# Patient Record
Sex: FEMALE | Race: WHITE | NOT HISPANIC OR LATINO
[De-identification: names, ages, dates, MRNs, and addresses within clinical notes are randomized per-mention and may not be internally consistent; named-entity substitution may affect disease eponyms.]

---

## 2018-04-19 ENCOUNTER — RESULT REVIEW (OUTPATIENT)
Age: 63
End: 2018-04-19

## 2018-05-30 ENCOUNTER — APPOINTMENT (OUTPATIENT)
Dept: PLASTIC SURGERY | Facility: CLINIC | Age: 63
End: 2018-05-30
Payer: SELF-PAY

## 2018-05-30 PROBLEM — Z00.00 ENCOUNTER FOR PREVENTIVE HEALTH EXAMINATION: Status: ACTIVE | Noted: 2018-05-30

## 2018-05-30 PROCEDURE — 15788 CHEMICAL PEEL FACIAL EPIDRM: CPT

## 2018-06-20 ENCOUNTER — APPOINTMENT (OUTPATIENT)
Dept: PLASTIC SURGERY | Facility: CLINIC | Age: 63
End: 2018-06-20
Payer: COMMERCIAL

## 2018-06-20 VITALS
TEMPERATURE: 98.4 F | HEIGHT: 62 IN | WEIGHT: 118 LBS | BODY MASS INDEX: 21.71 KG/M2 | DIASTOLIC BLOOD PRESSURE: 53 MMHG | SYSTOLIC BLOOD PRESSURE: 90 MMHG | OXYGEN SATURATION: 98 % | HEART RATE: 61 BPM | RESPIRATION RATE: 20 BRPM

## 2018-06-20 PROCEDURE — 11402 EXC TR-EXT B9+MARG 1.1-2 CM: CPT | Mod: 59

## 2018-06-20 PROCEDURE — 12041 INTMD RPR N-HF/GENIT 2.5CM/<: CPT | Mod: 59

## 2018-06-20 PROCEDURE — 12031 INTMD RPR S/A/T/EXT 2.5 CM/<: CPT

## 2018-06-20 PROCEDURE — 11422 EXC H-F-NK-SP B9+MARG 1.1-2: CPT | Mod: 59

## 2018-06-21 ENCOUNTER — APPOINTMENT (OUTPATIENT)
Dept: PLASTIC SURGERY | Facility: CLINIC | Age: 63
End: 2018-06-21
Payer: SELF-PAY

## 2018-06-21 PROCEDURE — 17999 UNLISTD PX SKN MUC MEMB SUBQ: CPT | Mod: 79

## 2018-06-28 ENCOUNTER — OTHER (OUTPATIENT)
Age: 63
End: 2018-06-28

## 2018-06-28 ENCOUNTER — APPOINTMENT (OUTPATIENT)
Dept: PLASTIC SURGERY | Facility: CLINIC | Age: 63
End: 2018-06-28
Payer: COMMERCIAL

## 2018-06-28 ENCOUNTER — APPOINTMENT (OUTPATIENT)
Dept: PLASTIC SURGERY | Facility: CLINIC | Age: 63
End: 2018-06-28
Payer: SELF-PAY

## 2018-06-28 DIAGNOSIS — D23.61 OTHER BENIGN NEOPLASM OF SKIN OF RIGHT UPPER LIMB, INCLUDING SHOULDER: ICD-10-CM

## 2018-06-28 DIAGNOSIS — D23.5 OTHER BENIGN NEOPLASM OF SKIN OF TRUNK: ICD-10-CM

## 2018-06-28 PROCEDURE — 99024 POSTOP FOLLOW-UP VISIT: CPT

## 2018-06-28 PROCEDURE — 17999 UNLISTD PX SKN MUC MEMB SUBQ: CPT | Mod: NC

## 2018-07-11 ENCOUNTER — APPOINTMENT (OUTPATIENT)
Dept: PLASTIC SURGERY | Facility: CLINIC | Age: 63
End: 2018-07-11

## 2018-07-19 ENCOUNTER — APPOINTMENT (OUTPATIENT)
Dept: PLASTIC SURGERY | Facility: CLINIC | Age: 63
End: 2018-07-19
Payer: SELF-PAY

## 2018-07-19 PROCEDURE — 17999 UNLISTD PX SKN MUC MEMB SUBQ: CPT | Mod: 79

## 2018-08-09 ENCOUNTER — APPOINTMENT (OUTPATIENT)
Dept: PLASTIC SURGERY | Facility: CLINIC | Age: 63
End: 2018-08-09
Payer: SELF-PAY

## 2018-08-09 PROCEDURE — 17999 UNLISTD PX SKN MUC MEMB SUBQ: CPT | Mod: 79

## 2018-08-28 ENCOUNTER — APPOINTMENT (OUTPATIENT)
Dept: PLASTIC SURGERY | Facility: CLINIC | Age: 63
End: 2018-08-28
Payer: SELF-PAY

## 2018-08-28 PROCEDURE — 17999 UNLISTD PX SKN MUC MEMB SUBQ: CPT | Mod: 79

## 2018-09-18 ENCOUNTER — APPOINTMENT (OUTPATIENT)
Dept: PLASTIC SURGERY | Facility: CLINIC | Age: 63
End: 2018-09-18
Payer: SELF-PAY

## 2018-09-18 PROCEDURE — 17999 UNLISTD PX SKN MUC MEMB SUBQ: CPT

## 2018-10-05 ENCOUNTER — APPOINTMENT (OUTPATIENT)
Dept: PLASTIC SURGERY | Facility: CLINIC | Age: 63
End: 2018-10-05
Payer: SELF-PAY

## 2018-10-05 PROCEDURE — 17999 UNLISTD PX SKN MUC MEMB SUBQ: CPT

## 2018-10-23 ENCOUNTER — APPOINTMENT (OUTPATIENT)
Dept: PLASTIC SURGERY | Facility: CLINIC | Age: 63
End: 2018-10-23
Payer: SELF-PAY

## 2018-10-23 PROCEDURE — 17999 UNLISTD PX SKN MUC MEMB SUBQ: CPT

## 2018-11-09 ENCOUNTER — APPOINTMENT (OUTPATIENT)
Dept: PLASTIC SURGERY | Facility: CLINIC | Age: 63
End: 2018-11-09
Payer: SELF-PAY

## 2018-11-09 PROCEDURE — 15789 CHEMICAL PEEL FACIAL DERMAL: CPT

## 2018-12-04 ENCOUNTER — APPOINTMENT (OUTPATIENT)
Dept: PLASTIC SURGERY | Facility: CLINIC | Age: 63
End: 2018-12-04
Payer: SELF-PAY

## 2018-12-04 PROCEDURE — 15789 CHEMICAL PEEL FACIAL DERMAL: CPT | Mod: 78

## 2019-02-08 ENCOUNTER — APPOINTMENT (OUTPATIENT)
Dept: PLASTIC SURGERY | Facility: CLINIC | Age: 64
End: 2019-02-08
Payer: SELF-PAY

## 2019-02-08 PROCEDURE — 15789 CHEMICAL PEEL FACIAL DERMAL: CPT | Mod: 79

## 2019-03-12 ENCOUNTER — APPOINTMENT (OUTPATIENT)
Dept: PLASTIC SURGERY | Facility: CLINIC | Age: 64
End: 2019-03-12
Payer: SELF-PAY

## 2019-03-12 PROCEDURE — 15789 CHEMICAL PEEL FACIAL DERMAL: CPT | Mod: 79

## 2019-04-10 ENCOUNTER — APPOINTMENT (OUTPATIENT)
Dept: PLASTIC SURGERY | Facility: CLINIC | Age: 64
End: 2019-04-10
Payer: SELF-PAY

## 2019-04-10 PROCEDURE — 15789 CHEMICAL PEEL FACIAL DERMAL: CPT | Mod: 78

## 2019-05-07 ENCOUNTER — APPOINTMENT (OUTPATIENT)
Dept: PLASTIC SURGERY | Facility: CLINIC | Age: 64
End: 2019-05-07
Payer: SELF-PAY

## 2019-05-07 PROCEDURE — 15789 CHEMICAL PEEL FACIAL DERMAL: CPT | Mod: 78

## 2019-05-28 ENCOUNTER — APPOINTMENT (OUTPATIENT)
Dept: PLASTIC SURGERY | Facility: CLINIC | Age: 64
End: 2019-05-28
Payer: SELF-PAY

## 2019-05-28 PROCEDURE — 15789 CHEMICAL PEEL FACIAL DERMAL: CPT | Mod: 78

## 2019-06-18 ENCOUNTER — APPOINTMENT (OUTPATIENT)
Dept: PLASTIC SURGERY | Facility: CLINIC | Age: 64
End: 2019-06-18
Payer: SELF-PAY

## 2019-06-18 PROCEDURE — 15789 CHEMICAL PEEL FACIAL DERMAL: CPT | Mod: 78

## 2019-06-21 ENCOUNTER — APPOINTMENT (OUTPATIENT)
Dept: PLASTIC SURGERY | Facility: CLINIC | Age: 64
End: 2019-06-21

## 2019-07-16 ENCOUNTER — APPOINTMENT (OUTPATIENT)
Dept: PLASTIC SURGERY | Facility: CLINIC | Age: 64
End: 2019-07-16
Payer: SELF-PAY

## 2019-07-16 PROCEDURE — 15789 CHEMICAL PEEL FACIAL DERMAL: CPT | Mod: 78

## 2019-08-16 ENCOUNTER — APPOINTMENT (OUTPATIENT)
Dept: PLASTIC SURGERY | Facility: CLINIC | Age: 64
End: 2019-08-16
Payer: SELF-PAY

## 2019-08-16 PROCEDURE — 15789 CHEMICAL PEEL FACIAL DERMAL: CPT | Mod: 78

## 2019-09-13 ENCOUNTER — APPOINTMENT (OUTPATIENT)
Dept: PLASTIC SURGERY | Facility: CLINIC | Age: 64
End: 2019-09-13
Payer: SELF-PAY

## 2019-09-13 PROCEDURE — 15789 CHEMICAL PEEL FACIAL DERMAL: CPT | Mod: 58

## 2019-10-11 ENCOUNTER — APPOINTMENT (OUTPATIENT)
Dept: PLASTIC SURGERY | Facility: CLINIC | Age: 64
End: 2019-10-11
Payer: SELF-PAY

## 2019-10-11 PROCEDURE — 15789 CHEMICAL PEEL FACIAL DERMAL: CPT | Mod: 78

## 2019-11-05 ENCOUNTER — APPOINTMENT (OUTPATIENT)
Dept: PLASTIC SURGERY | Facility: CLINIC | Age: 64
End: 2019-11-05
Payer: SELF-PAY

## 2019-11-05 PROCEDURE — 15789 CHEMICAL PEEL FACIAL DERMAL: CPT | Mod: 78

## 2019-12-04 ENCOUNTER — APPOINTMENT (OUTPATIENT)
Dept: PLASTIC SURGERY | Facility: CLINIC | Age: 64
End: 2019-12-04
Payer: SELF-PAY

## 2019-12-04 PROCEDURE — 15789 CHEMICAL PEEL FACIAL DERMAL: CPT | Mod: 78

## 2019-12-31 ENCOUNTER — APPOINTMENT (OUTPATIENT)
Dept: PLASTIC SURGERY | Facility: CLINIC | Age: 64
End: 2019-12-31
Payer: SELF-PAY

## 2019-12-31 PROCEDURE — 15789 CHEMICAL PEEL FACIAL DERMAL: CPT | Mod: 78

## 2020-01-21 ENCOUNTER — APPOINTMENT (OUTPATIENT)
Dept: PLASTIC SURGERY | Facility: CLINIC | Age: 65
End: 2020-01-21
Payer: SELF-PAY

## 2020-01-21 PROCEDURE — 15789 CHEMICAL PEEL FACIAL DERMAL: CPT | Mod: 78

## 2021-01-15 ENCOUNTER — TRANSCRIPTION ENCOUNTER (OUTPATIENT)
Age: 66
End: 2021-01-15

## 2023-02-14 ENCOUNTER — APPOINTMENT (OUTPATIENT)
Dept: PLASTIC SURGERY | Facility: CLINIC | Age: 68
End: 2023-02-14
Payer: MEDICARE

## 2023-02-14 VITALS
OXYGEN SATURATION: 100 % | BODY MASS INDEX: 21.44 KG/M2 | HEART RATE: 67 BPM | RESPIRATION RATE: 20 BRPM | WEIGHT: 121 LBS | SYSTOLIC BLOOD PRESSURE: 132 MMHG | TEMPERATURE: 98.2 F | HEIGHT: 63 IN | DIASTOLIC BLOOD PRESSURE: 72 MMHG

## 2023-02-14 DIAGNOSIS — Z87.891 PERSONAL HISTORY OF NICOTINE DEPENDENCE: ICD-10-CM

## 2023-02-14 DIAGNOSIS — Z87.39 PERSONAL HISTORY OF OTHER DISEASES OF THE MUSCULOSKELETAL SYSTEM AND CONNECTIVE TISSUE: ICD-10-CM

## 2023-02-14 DIAGNOSIS — Z78.9 OTHER SPECIFIED HEALTH STATUS: ICD-10-CM

## 2023-02-14 DIAGNOSIS — N62 HYPERTROPHY OF BREAST: ICD-10-CM

## 2023-02-14 PROCEDURE — 99204 OFFICE O/P NEW MOD 45 MIN: CPT

## 2023-02-14 RX ORDER — MONTELUKAST SODIUM 10 MG/1
10 TABLET, FILM COATED ORAL
Refills: 0 | Status: ACTIVE | COMMUNITY

## 2023-02-14 RX ORDER — ATORVASTATIN CALCIUM 10 MG/1
10 TABLET, FILM COATED ORAL
Refills: 0 | Status: ACTIVE | COMMUNITY

## 2023-02-14 RX ORDER — LORATADINE 5 MG/5 ML
10-240 SOLUTION, ORAL ORAL
Refills: 0 | Status: ACTIVE | COMMUNITY

## 2023-02-14 RX ORDER — UBIDECARENONE/VIT E ACET 100MG-5
1000 CAPSULE ORAL
Refills: 0 | Status: ACTIVE | COMMUNITY

## 2023-02-15 PROBLEM — N62 BREAST HYPERTROPHY: Status: ACTIVE | Noted: 2023-02-15

## 2023-02-15 NOTE — ASSESSMENT
[FreeTextEntry1] : Ms. JAQUELINE CHERRY  is a suitable candidate for a pedicled breast reduction.  Planning   medial pedicle with lollipop scar  I anticipate removal of 300 gm per side slightly more as the r is mildly larger by  15-25 gm

## 2023-02-15 NOTE — PHYSICAL EXAM
[NI] : Normal [de-identified] : sn n 28 cm r 27 cm l nl sens no mass grade 2 ptosis  [de-identified] : c section scar well healed

## 2023-02-15 NOTE — HISTORY OF PRESENT ILLNESS
[FreeTextEntry1] :  Ms. JAQUELINE CHERRY  is a   67 year  old female complaining of large hypertrophic breasts, causing neck, back and shoulder strain.  She also complains of headache and fatigue from constantly trying to support her spine.  Her symptoms began shortly after puberty and remain a constant source  of discomfort and social embarrassment. Her symptoms are not relieved by postural changes weight reduction or supportive measures.  She has been under the care of other practitioners for relief which is ineffectual. The patient is a DD cup and desires reduction to C cup.   The risks, benefits, alternatives limitations and the permanent scars were outlined with the patient and she is prepared for a pedicled  breast reduction with lollipop scar.